# Patient Record
Sex: MALE | Race: WHITE
[De-identification: names, ages, dates, MRNs, and addresses within clinical notes are randomized per-mention and may not be internally consistent; named-entity substitution may affect disease eponyms.]

---

## 2020-10-22 ENCOUNTER — HOSPITAL ENCOUNTER (INPATIENT)
Dept: HOSPITAL 8 - ED | Age: 80
LOS: 3 days | Discharge: HOME | DRG: 871 | End: 2020-10-25
Attending: INTERNAL MEDICINE | Admitting: INTERNAL MEDICINE
Payer: COMMERCIAL

## 2020-10-22 VITALS — DIASTOLIC BLOOD PRESSURE: 72 MMHG | SYSTOLIC BLOOD PRESSURE: 125 MMHG

## 2020-10-22 VITALS — BODY MASS INDEX: 28.5 KG/M2 | WEIGHT: 241.41 LBS | HEIGHT: 77 IN

## 2020-10-22 DIAGNOSIS — Z87.891: ICD-10-CM

## 2020-10-22 DIAGNOSIS — J69.0: ICD-10-CM

## 2020-10-22 DIAGNOSIS — Z86.73: ICD-10-CM

## 2020-10-22 DIAGNOSIS — Z79.52: ICD-10-CM

## 2020-10-22 DIAGNOSIS — H40.9: ICD-10-CM

## 2020-10-22 DIAGNOSIS — D84.9: ICD-10-CM

## 2020-10-22 DIAGNOSIS — R65.20: ICD-10-CM

## 2020-10-22 DIAGNOSIS — I48.20: ICD-10-CM

## 2020-10-22 DIAGNOSIS — M35.3: ICD-10-CM
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
## 2020-10-22 DIAGNOSIS — Z86.711: ICD-10-CM

## 2020-10-22 DIAGNOSIS — I10: ICD-10-CM

## 2020-10-22 DIAGNOSIS — J96.01: ICD-10-CM

## 2020-10-22 DIAGNOSIS — G47.00: ICD-10-CM

## 2020-10-22 DIAGNOSIS — K21.9: ICD-10-CM

## 2020-10-22 DIAGNOSIS — E78.5: ICD-10-CM

## 2020-10-22 DIAGNOSIS — Z66: ICD-10-CM

## 2020-10-22 DIAGNOSIS — A41.9: Primary | ICD-10-CM

## 2020-10-22 DIAGNOSIS — T17.918A: ICD-10-CM

## 2020-10-22 DIAGNOSIS — Z82.49: ICD-10-CM

## 2020-10-22 DIAGNOSIS — Z95.2: ICD-10-CM

## 2020-10-22 LAB
<PLATELET ESTIMATE>: ADEQUATE
<PLT MORPHOLOGY>: (no result)
ALBUMIN SERPL-MCNC: 3.4 G/DL (ref 3.4–5)
ALP SERPL-CCNC: 147 U/L (ref 45–117)
ALT SERPL-CCNC: 26 U/L (ref 12–78)
ANION GAP SERPL CALC-SCNC: 9 MMOL/L (ref 5–15)
APTT BLD: 24 SECONDS (ref 25–31)
BAND#(MANUAL): 0.23 X10^3/UL
BILIRUB DIRECT SERPL-MCNC: NORMAL MG/DL
BILIRUB SERPL-MCNC: 0.7 MG/DL (ref 0.2–1)
CALCIUM SERPL-MCNC: 8.9 MG/DL (ref 8.5–10.1)
CHLORIDE SERPL-SCNC: 110 MMOL/L (ref 98–107)
CREAT SERPL-MCNC: 0.84 MG/DL (ref 0.7–1.3)
EOS#(MANUAL): 0.23 X10^3/UL (ref 0–0.4)
EOS% (MANUAL): 1 % (ref 1–7)
ERYTHROCYTE [DISTWIDTH] IN BLOOD BY AUTOMATED COUNT: 14.1 % (ref 9.4–14.8)
INR PPP: 1.08 (ref 0.93–1.1)
LYMPH#(MANUAL): 2.49 X10^3/UL (ref 1–3.4)
LYMPHS% (MANUAL): 11 % (ref 22–44)
MCH RBC QN AUTO: 30.1 PG (ref 27.5–34.5)
MCHC RBC AUTO-ENTMCNC: 31.8 G/DL (ref 33.2–36.2)
MD: YES
MONOS#(MANUAL): 0.68 X10^3/UL (ref 0.3–2.7)
MONOS% (MANUAL): 3 % (ref 2–9)
NEUTS BAND NFR BLD: 1 % (ref 0–7)
O2/TOTAL GAS SETTING VFR VENT: 50 %
PLATELET # BLD AUTO: 222 X10^3/UL (ref 130–400)
PMV BLD AUTO: 7.7 FL (ref 7.4–10.4)
PROT SERPL-MCNC: 6.8 G/DL (ref 6.4–8.2)
PROTHROMBIN TIME: 11.4 SECONDS (ref 9.6–11.5)
RBC # BLD AUTO: 4.82 X10^6/UL (ref 4.38–5.82)
SEG#(MANUAL): 18.98 X10^3/UL (ref 1.8–6.8)
SEGS% (MANUAL): 84 % (ref 42–75)
TROPONIN I SERPL-MCNC: < 0.015 NG/ML (ref 0–0.04)

## 2020-10-22 PROCEDURE — 99291 CRITICAL CARE FIRST HOUR: CPT

## 2020-10-22 PROCEDURE — 87040 BLOOD CULTURE FOR BACTERIA: CPT

## 2020-10-22 PROCEDURE — 90686 IIV4 VACC NO PRSV 0.5 ML IM: CPT

## 2020-10-22 PROCEDURE — 80053 COMPREHEN METABOLIC PANEL: CPT

## 2020-10-22 PROCEDURE — 85610 PROTHROMBIN TIME: CPT

## 2020-10-22 PROCEDURE — 96361 HYDRATE IV INFUSION ADD-ON: CPT

## 2020-10-22 PROCEDURE — 71045 X-RAY EXAM CHEST 1 VIEW: CPT

## 2020-10-22 PROCEDURE — 85730 THROMBOPLASTIN TIME PARTIAL: CPT

## 2020-10-22 PROCEDURE — 74230 X-RAY XM SWLNG FUNCJ C+: CPT

## 2020-10-22 PROCEDURE — 36415 COLL VENOUS BLD VENIPUNCTURE: CPT

## 2020-10-22 PROCEDURE — 93005 ELECTROCARDIOGRAM TRACING: CPT

## 2020-10-22 PROCEDURE — 83605 ASSAY OF LACTIC ACID: CPT

## 2020-10-22 PROCEDURE — 84484 ASSAY OF TROPONIN QUANT: CPT

## 2020-10-22 PROCEDURE — 96365 THER/PROPH/DIAG IV INF INIT: CPT

## 2020-10-22 PROCEDURE — 5A09357 ASSISTANCE WITH RESPIRATORY VENTILATION, LESS THAN 24 CONSECUTIVE HOURS, CONTINUOUS POSITIVE AIRWAY PRESSURE: ICD-10-PCS | Performed by: INTERNAL MEDICINE

## 2020-10-22 PROCEDURE — 36600 WITHDRAWAL OF ARTERIAL BLOOD: CPT

## 2020-10-22 PROCEDURE — 83880 ASSAY OF NATRIURETIC PEPTIDE: CPT

## 2020-10-22 PROCEDURE — 94640 AIRWAY INHALATION TREATMENT: CPT

## 2020-10-22 PROCEDURE — 85025 COMPLETE CBC W/AUTO DIFF WBC: CPT

## 2020-10-22 PROCEDURE — 80048 BASIC METABOLIC PNL TOTAL CA: CPT

## 2020-10-22 PROCEDURE — 87081 CULTURE SCREEN ONLY: CPT

## 2020-10-22 PROCEDURE — 82803 BLOOD GASES ANY COMBINATION: CPT

## 2020-10-22 PROCEDURE — 74220 X-RAY XM ESOPHAGUS 1CNTRST: CPT

## 2020-10-22 PROCEDURE — 94660 CPAP INITIATION&MGMT: CPT

## 2020-10-22 PROCEDURE — 71275 CT ANGIOGRAPHY CHEST: CPT

## 2020-10-22 RX ADMIN — APIXABAN SCH MG: 5 TABLET, FILM COATED ORAL at 21:55

## 2020-10-22 RX ADMIN — TAZOBACTAM SODIUM AND PIPERACILLIN SODIUM SCH MLS/HR: 375; 3 INJECTION, SOLUTION INTRAVENOUS at 12:00

## 2020-10-22 RX ADMIN — LATANOPROST SCH DROP: 50 SOLUTION OPHTHALMIC at 21:56

## 2020-10-22 RX ADMIN — TAZOBACTAM SODIUM AND PIPERACILLIN SODIUM SCH MLS/HR: 375; 3 INJECTION, SOLUTION INTRAVENOUS at 18:13

## 2020-10-22 RX ADMIN — BRIMONIDINE TARTRATE SCH DROP: 2 SOLUTION/ DROPS OPHTHALMIC at 22:31

## 2020-10-22 NOTE — NUR
pt refusded to continue wearing bipap, requested nasal cannula only, placed on 
6l nc, o2 sats 93-95

## 2020-10-22 NOTE — NUR
TASK RN: DINO CARRINGTON, VANCOMYCIN ADMINISTERED PER EMAR



   PATIENT TOLERATING 6L NC WITH MINIMAL WOB, POX 94%



PROVIDED WITH WATER/HOSPITAL BED ORDERED

## 2020-10-23 VITALS — DIASTOLIC BLOOD PRESSURE: 74 MMHG | SYSTOLIC BLOOD PRESSURE: 127 MMHG

## 2020-10-23 VITALS — SYSTOLIC BLOOD PRESSURE: 117 MMHG | DIASTOLIC BLOOD PRESSURE: 64 MMHG

## 2020-10-23 VITALS — SYSTOLIC BLOOD PRESSURE: 118 MMHG | DIASTOLIC BLOOD PRESSURE: 70 MMHG

## 2020-10-23 VITALS — DIASTOLIC BLOOD PRESSURE: 80 MMHG | SYSTOLIC BLOOD PRESSURE: 131 MMHG

## 2020-10-23 LAB
ALBUMIN SERPL-MCNC: 3 G/DL (ref 3.4–5)
ALP SERPL-CCNC: 111 U/L (ref 45–117)
ALT SERPL-CCNC: 18 U/L (ref 12–78)
ANION GAP SERPL CALC-SCNC: 5 MMOL/L (ref 5–15)
BASOPHILS # BLD AUTO: 0 X10^3/UL (ref 0–0.1)
BASOPHILS NFR BLD AUTO: 0 % (ref 0–1)
BILIRUB SERPL-MCNC: 0.8 MG/DL (ref 0.2–1)
CALCIUM SERPL-MCNC: 8.8 MG/DL (ref 8.5–10.1)
CHLORIDE SERPL-SCNC: 112 MMOL/L (ref 98–107)
CREAT SERPL-MCNC: 0.58 MG/DL (ref 0.7–1.3)
EOSINOPHIL # BLD AUTO: 0.2 X10^3/UL (ref 0–0.4)
EOSINOPHIL NFR BLD AUTO: 1 % (ref 1–7)
ERYTHROCYTE [DISTWIDTH] IN BLOOD BY AUTOMATED COUNT: 14.2 % (ref 9.4–14.8)
LYMPHOCYTES # BLD AUTO: 1.2 X10^3/UL (ref 1–3.4)
LYMPHOCYTES NFR BLD AUTO: 8 % (ref 22–44)
MCH RBC QN AUTO: 30.4 PG (ref 27.5–34.5)
MCHC RBC AUTO-ENTMCNC: 32.3 G/DL (ref 33.2–36.2)
MD: (no result)
MONOCYTES # BLD AUTO: 1.2 X10^3/UL (ref 0.2–0.8)
MONOCYTES NFR BLD AUTO: 8 % (ref 2–9)
NEUTROPHILS # BLD AUTO: 13.2 X10^3/UL (ref 1.8–6.8)
NEUTROPHILS NFR BLD AUTO: 83 % (ref 42–75)
PLATELET # BLD AUTO: 176 X10^3/UL (ref 130–400)
PMV BLD AUTO: 7.9 FL (ref 7.4–10.4)
PROT SERPL-MCNC: 5.9 G/DL (ref 6.4–8.2)
RBC # BLD AUTO: 3.98 X10^6/UL (ref 4.38–5.82)

## 2020-10-23 RX ADMIN — Medication SCH MG: at 20:43

## 2020-10-23 RX ADMIN — LATANOPROST SCH DROP: 50 SOLUTION OPHTHALMIC at 20:44

## 2020-10-23 RX ADMIN — LOSARTAN POTASSIUM SCH MG: 100 TABLET, FILM COATED ORAL at 13:20

## 2020-10-23 RX ADMIN — ATORVASTATIN CALCIUM SCH MG: 40 TABLET, FILM COATED ORAL at 20:42

## 2020-10-23 RX ADMIN — GABAPENTIN SCH MG: 300 CAPSULE ORAL at 13:20

## 2020-10-23 RX ADMIN — BRIMONIDINE TARTRATE SCH DROP: 2 SOLUTION/ DROPS OPHTHALMIC at 08:32

## 2020-10-23 RX ADMIN — APIXABAN SCH MG: 5 TABLET, FILM COATED ORAL at 20:43

## 2020-10-23 RX ADMIN — TAZOBACTAM SODIUM AND PIPERACILLIN SODIUM SCH MLS/HR: 375; 3 INJECTION, SOLUTION INTRAVENOUS at 18:07

## 2020-10-23 RX ADMIN — TAZOBACTAM SODIUM AND PIPERACILLIN SODIUM SCH MLS/HR: 375; 3 INJECTION, SOLUTION INTRAVENOUS at 06:23

## 2020-10-23 RX ADMIN — DOCUSATE SODIUM 50MG AND SENNOSIDES 8.6MG SCH TAB: 8.6; 5 TABLET, FILM COATED ORAL at 08:46

## 2020-10-23 RX ADMIN — APIXABAN SCH MG: 5 TABLET, FILM COATED ORAL at 08:31

## 2020-10-23 RX ADMIN — BRIMONIDINE TARTRATE SCH DROP: 2 SOLUTION/ DROPS OPHTHALMIC at 20:45

## 2020-10-23 RX ADMIN — AMLODIPINE BESYLATE SCH MG: 5 TABLET ORAL at 13:20

## 2020-10-23 RX ADMIN — TAZOBACTAM SODIUM AND PIPERACILLIN SODIUM SCH MLS/HR: 375; 3 INJECTION, SOLUTION INTRAVENOUS at 13:11

## 2020-10-23 RX ADMIN — GABAPENTIN SCH MG: 300 CAPSULE ORAL at 20:42

## 2020-10-23 RX ADMIN — TAZOBACTAM SODIUM AND PIPERACILLIN SODIUM SCH MLS/HR: 375; 3 INJECTION, SOLUTION INTRAVENOUS at 00:29

## 2020-10-23 RX ADMIN — DORZOLAMIDE HYDROCHLORIDE SCH DROP: 20 SOLUTION/ DROPS OPHTHALMIC at 20:45

## 2020-10-23 RX ADMIN — GABAPENTIN SCH MG: 300 CAPSULE ORAL at 16:29

## 2020-10-23 RX ADMIN — TIMOLOL MALEATE SCH DROP: 5 SOLUTION OPHTHALMIC at 20:46

## 2020-10-23 RX ADMIN — OMEPRAZOLE SCH MG: 20 CAPSULE, DELAYED RELEASE ORAL at 13:20

## 2020-10-23 RX ADMIN — TIOTROPIUM BROMIDE SCH MCG: 18 CAPSULE ORAL; RESPIRATORY (INHALATION) at 13:00

## 2020-10-23 RX ADMIN — VANCOMYCIN HYDROCHLORIDE SCH MLS/HR: 1 INJECTION, POWDER, LYOPHILIZED, FOR SOLUTION INTRAVENOUS at 11:00

## 2020-10-23 RX ADMIN — TEMAZEPAM PRN MG: 15 CAPSULE ORAL at 20:49

## 2020-10-24 VITALS — DIASTOLIC BLOOD PRESSURE: 80 MMHG | SYSTOLIC BLOOD PRESSURE: 122 MMHG

## 2020-10-24 VITALS — DIASTOLIC BLOOD PRESSURE: 66 MMHG | SYSTOLIC BLOOD PRESSURE: 113 MMHG

## 2020-10-24 VITALS — SYSTOLIC BLOOD PRESSURE: 136 MMHG | DIASTOLIC BLOOD PRESSURE: 63 MMHG

## 2020-10-24 VITALS — DIASTOLIC BLOOD PRESSURE: 81 MMHG | SYSTOLIC BLOOD PRESSURE: 128 MMHG

## 2020-10-24 RX ADMIN — TAZOBACTAM SODIUM AND PIPERACILLIN SODIUM SCH MLS/HR: 375; 3 INJECTION, SOLUTION INTRAVENOUS at 18:16

## 2020-10-24 RX ADMIN — GABAPENTIN SCH MG: 300 CAPSULE ORAL at 14:56

## 2020-10-24 RX ADMIN — TIOTROPIUM BROMIDE SCH MCG: 18 CAPSULE ORAL; RESPIRATORY (INHALATION) at 09:54

## 2020-10-24 RX ADMIN — DORZOLAMIDE HYDROCHLORIDE SCH DROP: 20 SOLUTION/ DROPS OPHTHALMIC at 20:15

## 2020-10-24 RX ADMIN — APIXABAN SCH MG: 5 TABLET, FILM COATED ORAL at 08:03

## 2020-10-24 RX ADMIN — LATANOPROST SCH DROP: 50 SOLUTION OPHTHALMIC at 20:15

## 2020-10-24 RX ADMIN — LOSARTAN POTASSIUM SCH MG: 100 TABLET, FILM COATED ORAL at 08:04

## 2020-10-24 RX ADMIN — GABAPENTIN SCH MG: 300 CAPSULE ORAL at 08:02

## 2020-10-24 RX ADMIN — AMLODIPINE BESYLATE SCH MG: 5 TABLET ORAL at 08:01

## 2020-10-24 RX ADMIN — TEMAZEPAM PRN MG: 15 CAPSULE ORAL at 22:28

## 2020-10-24 RX ADMIN — BRIMONIDINE TARTRATE SCH DROP: 2 SOLUTION/ DROPS OPHTHALMIC at 20:18

## 2020-10-24 RX ADMIN — TAZOBACTAM SODIUM AND PIPERACILLIN SODIUM SCH MLS/HR: 375; 3 INJECTION, SOLUTION INTRAVENOUS at 13:00

## 2020-10-24 RX ADMIN — APIXABAN SCH MG: 5 TABLET, FILM COATED ORAL at 20:20

## 2020-10-24 RX ADMIN — TIMOLOL MALEATE SCH DROP: 5 SOLUTION OPHTHALMIC at 20:18

## 2020-10-24 RX ADMIN — ATORVASTATIN CALCIUM SCH MG: 40 TABLET, FILM COATED ORAL at 20:20

## 2020-10-24 RX ADMIN — TAZOBACTAM SODIUM AND PIPERACILLIN SODIUM SCH MLS/HR: 375; 3 INJECTION, SOLUTION INTRAVENOUS at 05:52

## 2020-10-24 RX ADMIN — TEMAZEPAM PRN MG: 15 CAPSULE ORAL at 21:27

## 2020-10-24 RX ADMIN — BRIMONIDINE TARTRATE SCH DROP: 2 SOLUTION/ DROPS OPHTHALMIC at 08:06

## 2020-10-24 RX ADMIN — VANCOMYCIN HYDROCHLORIDE SCH MLS/HR: 1 INJECTION, POWDER, LYOPHILIZED, FOR SOLUTION INTRAVENOUS at 10:44

## 2020-10-24 RX ADMIN — GABAPENTIN SCH MG: 300 CAPSULE ORAL at 20:18

## 2020-10-24 RX ADMIN — TAZOBACTAM SODIUM AND PIPERACILLIN SODIUM SCH MLS/HR: 375; 3 INJECTION, SOLUTION INTRAVENOUS at 00:14

## 2020-10-24 RX ADMIN — Medication SCH MG: at 20:19

## 2020-10-24 RX ADMIN — OMEPRAZOLE SCH MG: 20 CAPSULE, DELAYED RELEASE ORAL at 08:01

## 2020-10-24 RX ADMIN — DOCUSATE SODIUM 50MG AND SENNOSIDES 8.6MG SCH TAB: 8.6; 5 TABLET, FILM COATED ORAL at 08:08

## 2020-10-25 VITALS — DIASTOLIC BLOOD PRESSURE: 76 MMHG | SYSTOLIC BLOOD PRESSURE: 118 MMHG

## 2020-10-25 VITALS — SYSTOLIC BLOOD PRESSURE: 100 MMHG | DIASTOLIC BLOOD PRESSURE: 57 MMHG

## 2020-10-25 LAB
ANION GAP SERPL CALC-SCNC: 5 MMOL/L (ref 5–15)
BASOPHILS # BLD AUTO: 0 X10^3/UL (ref 0–0.1)
BASOPHILS NFR BLD AUTO: 0 % (ref 0–1)
CALCIUM SERPL-MCNC: 8.7 MG/DL (ref 8.5–10.1)
CHLORIDE SERPL-SCNC: 111 MMOL/L (ref 98–107)
CREAT SERPL-MCNC: 0.59 MG/DL (ref 0.7–1.3)
EOSINOPHIL # BLD AUTO: 0.1 X10^3/UL (ref 0–0.4)
EOSINOPHIL NFR BLD AUTO: 1 % (ref 1–7)
ERYTHROCYTE [DISTWIDTH] IN BLOOD BY AUTOMATED COUNT: 13.3 % (ref 9.4–14.8)
LYMPHOCYTES # BLD AUTO: 1.3 X10^3/UL (ref 1–3.4)
LYMPHOCYTES NFR BLD AUTO: 13 % (ref 22–44)
MCH RBC QN AUTO: 30.7 PG (ref 27.5–34.5)
MCHC RBC AUTO-ENTMCNC: 32.4 G/DL (ref 33.2–36.2)
MD: NO
MONOCYTES # BLD AUTO: 0.9 X10^3/UL (ref 0.2–0.8)
MONOCYTES NFR BLD AUTO: 9 % (ref 2–9)
NEUTROPHILS # BLD AUTO: 8 X10^3/UL (ref 1.8–6.8)
NEUTROPHILS NFR BLD AUTO: 77 % (ref 42–75)
PLATELET # BLD AUTO: 170 X10^3/UL (ref 130–400)
PMV BLD AUTO: 7.9 FL (ref 7.4–10.4)
RBC # BLD AUTO: 3.86 X10^6/UL (ref 4.38–5.82)

## 2020-10-25 RX ADMIN — GABAPENTIN SCH MG: 300 CAPSULE ORAL at 08:15

## 2020-10-25 RX ADMIN — TAZOBACTAM SODIUM AND PIPERACILLIN SODIUM SCH MLS/HR: 375; 3 INJECTION, SOLUTION INTRAVENOUS at 00:30

## 2020-10-25 RX ADMIN — TAZOBACTAM SODIUM AND PIPERACILLIN SODIUM SCH MLS/HR: 375; 3 INJECTION, SOLUTION INTRAVENOUS at 06:29

## 2020-10-25 RX ADMIN — APIXABAN SCH MG: 5 TABLET, FILM COATED ORAL at 08:15

## 2020-10-25 RX ADMIN — DOCUSATE SODIUM 50MG AND SENNOSIDES 8.6MG SCH TAB: 8.6; 5 TABLET, FILM COATED ORAL at 08:16

## 2020-10-25 RX ADMIN — BRIMONIDINE TARTRATE SCH DROP: 2 SOLUTION/ DROPS OPHTHALMIC at 08:09

## 2020-10-25 RX ADMIN — AMLODIPINE BESYLATE SCH MG: 5 TABLET ORAL at 08:14

## 2020-10-25 RX ADMIN — LOSARTAN POTASSIUM SCH MG: 100 TABLET, FILM COATED ORAL at 08:15
